# Patient Record
Sex: MALE | Race: WHITE | NOT HISPANIC OR LATINO | Employment: STUDENT | URBAN - METROPOLITAN AREA
[De-identification: names, ages, dates, MRNs, and addresses within clinical notes are randomized per-mention and may not be internally consistent; named-entity substitution may affect disease eponyms.]

---
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
## 2025-04-08 ENCOUNTER — OFFICE VISIT (OUTPATIENT)
Dept: URGENT CARE | Facility: CLINIC | Age: 20
End: 2025-04-08
Payer: MEDICAID

## 2025-04-08 VITALS
SYSTOLIC BLOOD PRESSURE: 149 MMHG | HEART RATE: 69 BPM | RESPIRATION RATE: 17 BRPM | HEIGHT: 70 IN | WEIGHT: 145 LBS | OXYGEN SATURATION: 98 % | TEMPERATURE: 98 F | BODY MASS INDEX: 20.76 KG/M2 | DIASTOLIC BLOOD PRESSURE: 88 MMHG

## 2025-04-08 DIAGNOSIS — S62.643A CLOSED NONDISPLACED FRACTURE OF PROXIMAL PHALANX OF LEFT MIDDLE FINGER, INITIAL ENCOUNTER: Primary | ICD-10-CM

## 2025-04-08 DIAGNOSIS — S69.92XA FINGER INJURY, LEFT, INITIAL ENCOUNTER: ICD-10-CM

## 2025-04-08 PROCEDURE — 73130 X-RAY EXAM OF HAND: CPT | Mod: LT,S$GLB,, | Performed by: RADIOLOGY

## 2025-04-08 PROCEDURE — 99214 OFFICE O/P EST MOD 30 MIN: CPT | Mod: S$GLB,,, | Performed by: NURSE PRACTITIONER

## 2025-04-08 RX ORDER — IBUPROFEN 600 MG/1
600 TABLET ORAL
Status: COMPLETED | OUTPATIENT
Start: 2025-04-08 | End: 2025-04-08

## 2025-04-08 RX ADMIN — IBUPROFEN 600 MG: 600 TABLET ORAL at 01:04

## 2025-04-08 NOTE — LETTER
April 8, 2025      [facility]  [address]  [facility] [address]  Phone: [phone]       Patient: Emiliano Jimenez   YOB: 2005  Date of Visit: 04/08/2025    To Whom It May Concern:    Gray Jimenez  was at [facility] on 04/08/2025. The patient may return to work/school on 04/09/2025 with restrictions of no PE or sports until cleared by peds or orthopedic provider. If you have any questions or concerns, or if I can be of further assistance, please do not hesitate to contact me.    Sincerely,    Castillo Sherman, DNP

## 2025-04-08 NOTE — PATIENT INSTRUCTIONS
Use finger splint to keep the finger straight    Can use Motrin or Tylenol as needed for pain    Can ice and elevate affected area    Follow-up with your pediatric provider and/or orthopedic provider    We will place an orthopedic referral today

## 2025-04-08 NOTE — PROGRESS NOTES
"Subjective:      Patient ID: Emiliano Jimenez is a 19 y.o. male.    Vitals:  height is 5' 10" (1.778 m) and weight is 65.8 kg (145 lb). His temperature is 98.3 °F (36.8 °C). His blood pressure is 149/88 (abnormal) and his pulse is 69. His respiration is 17 and oxygen saturation is 98%.     Chief Complaint: Finger Injury    Patient states he ran into someone at school today and his left middle finger got bent backwards and was stuck and he had to straighten it back out.       Constitution: Negative.   HENT: Negative.     Neck: neck negative.   Cardiovascular: Negative.    Eyes: Negative.    Respiratory: Negative.     Gastrointestinal: Negative.    Endocrine: negative.   Genitourinary: Negative.    Musculoskeletal:  Positive for pain, trauma, joint pain and joint swelling.   Skin: Negative.    Allergic/Immunologic: Negative.    Neurological: Negative.    Hematologic/Lymphatic: Negative.    Psychiatric/Behavioral: Negative.        Objective:     Physical Exam   Constitutional: He is oriented to person, place, and time. He appears well-developed. He is cooperative.   HENT:   Head: Normocephalic and atraumatic.   Ears:   Right Ear: Hearing, tympanic membrane, external ear and ear canal normal.   Left Ear: Hearing, tympanic membrane, external ear and ear canal normal.   Nose: Nose normal. No mucosal edema or nasal deformity. No epistaxis. Right sinus exhibits no maxillary sinus tenderness and no frontal sinus tenderness. Left sinus exhibits no maxillary sinus tenderness and no frontal sinus tenderness.   Mouth/Throat: Uvula is midline, oropharynx is clear and moist and mucous membranes are normal. No trismus in the jaw. Normal dentition. No uvula swelling.   Eyes: Conjunctivae and lids are normal.   Neck: Trachea normal and phonation normal. Neck supple.   Cardiovascular: Normal rate, regular rhythm, normal heart sounds and normal pulses.   Pulmonary/Chest: Effort normal and breath sounds normal.   Abdominal: Normal " appearance and bowel sounds are normal. Soft.   Musculoskeletal: Normal range of motion.         General: Swelling, tenderness and signs of injury present. Normal range of motion.        Hands:       Comments: Tenderness, swelling, and edema, pain with rom   Neurological: no focal deficit. He is alert, oriented to person, place, and time and at baseline. He displays no weakness and normal reflexes. No cranial nerve deficit or sensory deficit. He exhibits normal muscle tone. Coordination normal.   Skin: Skin is warm, dry and intact. Capillary refill takes less than 2 seconds.   Psychiatric: His speech is normal and behavior is normal. Mood, judgment and thought content normal.   Nursing note and vitals reviewed.      EXAMINATION:  XR HAND COMPLETE 3 VIEW LEFT     CLINICAL HISTORY:  . Unspecified injury of left wrist, hand and finger(s), initial encounter     TECHNIQUE:  PA, lateral, and oblique views of the left hand were performed.     COMPARISON:  None     FINDINGS:  There is soft tissue swelling of the left middle finger.  There is a tiny 2 mm bone density seen adjacent to the PIP joint consistent with a small avulsion fracture.  Subluxation or dislocation of the PIP joint is not presently seen.  The rest of the bones of the left hand are intact.     Impression:     Tiny avulsion fragment at the PIP joint of the left index finger.  Soft tissue swelling noted.  Subluxation or dislocation is not presently seen.        Electronically signed by:Jose Trujillo MD  Date:                                            04/08/2025  Time:                                           13:20    Assessment:     1. Closed nondisplaced fracture of proximal phalanx of left middle finger, initial encounter    2. Finger injury, left, initial encounter        Plan:       Closed nondisplaced fracture of proximal phalanx of left middle finger, initial encounter    Finger injury, left, initial encounter  -     X-Ray Hand 3 View Left; Future;  Expected date: 04/08/2025  -     ibuprofen tablet 600 mg          Medical Decision Making:   Finger splint placed, buddie wraped it to the index finger with Coban, neurovascular intactUse finger splint to keep the finger straight    Can use Motrin or Tylenol as needed for pain    Can ice and elevate affected area    Follow-up with your pediatric provider and/or orthopedic provider    We will place an orthopedic referral today

## 2025-05-01 ENCOUNTER — HOSPITAL ENCOUNTER (EMERGENCY)
Facility: HOSPITAL | Age: 20
Discharge: HOME | End: 2025-05-02
Attending: STUDENT IN AN ORGANIZED HEALTH CARE EDUCATION/TRAINING PROGRAM

## 2025-05-01 DIAGNOSIS — M79.645 PAIN OF FINGER OF LEFT HAND: Primary | ICD-10-CM

## 2025-05-01 PROCEDURE — 99283 EMERGENCY DEPT VISIT LOW MDM: CPT | Mod: 25

## 2025-05-02 VITALS
HEART RATE: 66 BPM | HEIGHT: 70 IN | DIASTOLIC BLOOD PRESSURE: 76 MMHG | WEIGHT: 141.75 LBS | OXYGEN SATURATION: 98 % | SYSTOLIC BLOOD PRESSURE: 128 MMHG | BODY MASS INDEX: 20.29 KG/M2 | RESPIRATION RATE: 16 BRPM

## 2025-05-02 NOTE — DISCHARGE INSTRUCTIONS
Your x-ray did not show evidence of a new fracture or dislocation.  Wear your brace as directed.  Keep your orthopedic follow up appointment.      Please return to this facility or another ED as needed for any new or worsening symptoms including chest pain, shortness of breath, abdominal pain, nausea or vomiting, fever or chills. Please follow up with your primary care doctor in 3 days. Please take all medicines as prescribed.

## 2025-05-02 NOTE — ED PROVIDER NOTES
Encounter Date: 5/1/2025       History     Chief Complaint   Patient presents with    Finger Pain     Recent injury. Took splint off at ortho today and finger got yanked tonight and afraid has injured it     HPI  19-year-old no reported past medical history presents for evaluation of finger pain.  He injured his left middle finger earlier this month.  He had a small PIP fracture.  He saw ortho when they said he could come out of his splint.  He caught his finger on someone else's gown well on stage this evening.  Says it is finger hurts more.  Denies any fall or other traumatic injury.  Review of patient's allergies indicates:  No Known Allergies  History reviewed. No pertinent past medical history.  History reviewed. No pertinent surgical history.  Family History   Problem Relation Name Age of Onset    No Known Problems Father      Congenital heart disease Brother Salvatore     No Known Problems Brother Leo     No Known Problems Brother Dandre     No Known Problems Brother Mati     Bone cancer Brother Chaitanya Farfan        stage 4- now in remission    No Known Problems Brother Anand     Arrhythmia Neg Hx      Heart attacks under age 50 Neg Hx      Early death Neg Hx      Pacemaker/defibrilator Neg Hx       Social History[1]  Review of Systems   All other systems reviewed and are negative.      Physical Exam     Initial Vitals [05/01/25 2339]   BP Pulse Resp Temp SpO2   128/74 65 16 -- 98 %      MAP       --         Physical Exam    Nursing note and vitals reviewed.  Constitutional: He appears well-developed and well-nourished.   HENT:   Head: Normocephalic and atraumatic.   Eyes: Right eye exhibits no discharge. Left eye exhibits no discharge.   Neck: No tracheal deviation present.   Cardiovascular:  Normal rate, regular rhythm and intact distal pulses.           Pulmonary/Chest: No stridor. No respiratory distress.   Musculoskeletal:      Comments: Left middle finger is swollen, he is able to flex and extend it but  he has pain, he is tender to palpation over the PIP joint, otherwise nontender, no hand her forearm tenderness     Neurological: He is alert and oriented to person, place, and time.   Skin: Skin is warm and dry. Capillary refill takes less than 2 seconds.         ED Course   Procedures  Labs Reviewed   HEPATITIS C ANTIBODY   HIV 1 / 2 ANTIBODY          Imaging Results              X-Ray Hand 3 view Left (Final result)  Result time 05/02/25 00:28:22      Final result by Santo Kim DO (05/02/25 00:28:22)                   Impression:      Redemonstration of an avulsion fracture of the long finger proximal interphalangeal joint.  No new acute fracture or dislocation.      Electronically signed by: Santo Kim  Date:    05/02/2025  Time:    00:28               Narrative:    EXAMINATION:  XR HAND COMPLETE 3 VIEW LEFT    CLINICAL HISTORY:  PIP injury middle finger;.    TECHNIQUE:  PA, lateral, and oblique views of the left hand were performed.    COMPARISON:  04/08/2025.    FINDINGS:  Again seen is a small osseous density adjacent to the radial aspect of the long finger proximal interphalangeal joint, compatible with a small avulsion fracture, subacute or chronic, unchanged in appearance from prior.  There is no new acute fracture or dislocation. Alignment is normal. Joint spaces are preserved. There is soft tissue edema of the long finger.                                       Medications - No data to display  Medical Decision Making  Re-injured his finger today when he caught it on a piece of clothing, his vitals are within normal limits, on exam he is neurovascularly intact.  He has tenderness to palpation over his PIP joint.  Finger does not look infected.  Flexion-extension at that joint intact.  I will get an x-ray.  I will  him to wear splint.  I will have him to follow up with Orthopedics.  Father is at bedside as well    Amount and/or Complexity of Data Reviewed  Independent Historian: parent      Details: Reports he caught it on someone scan today on stage at school  External Data Reviewed: notes.     Details: Orthopedic from 04/30/2025, recommends coming out of the splint for 4 hours a day and active range of motion  Radiology: ordered and independent interpretation performed. Decision-making details documented in ED Course.               ED Course as of 05/02/25 0039   Fri May 02, 2025   0021 Avulsion fracture noted my independent interpretation PIP [IC]   0038 Repeat exam reassuring discussed results with the patient in his dad, he will take Tylenol and anti-inflammatory at home, Return precautions/follow up instructions/treatment plan given, expressed agreement and understanding.    [IC]      ED Course User Index  [IC] Chandler Hays MD                           Clinical Impression:  Final diagnoses:  [M79.645] Pain of finger of left hand (Primary)                   [1]   Social History  Tobacco Use    Smoking status: Never    Smokeless tobacco: Never   Substance Use Topics    Alcohol use: Never    Drug use: Never        Chandler Hays MD  05/02/25 0039

## 2025-05-27 DIAGNOSIS — S62.618D: Primary | ICD-10-CM

## 2025-05-29 ENCOUNTER — CLINICAL SUPPORT (OUTPATIENT)
Dept: REHABILITATION | Facility: HOSPITAL | Age: 20
End: 2025-05-29
Payer: MEDICAID

## 2025-05-29 DIAGNOSIS — M79.645 PAIN OF FINGER OF LEFT HAND: ICD-10-CM

## 2025-05-29 DIAGNOSIS — R53.1 WEAKNESS: ICD-10-CM

## 2025-05-29 DIAGNOSIS — M25.60 STIFFNESS IN JOINT: Primary | ICD-10-CM

## 2025-05-29 PROBLEM — M79.646 PAIN IN FINGER: Status: ACTIVE | Noted: 2025-05-29

## 2025-05-29 PROCEDURE — 97165 OT EVAL LOW COMPLEX 30 MIN: CPT | Mod: PN

## 2025-05-29 PROCEDURE — 97530 THERAPEUTIC ACTIVITIES: CPT | Mod: PN

## 2025-05-29 NOTE — PATIENT INSTRUCTIONS
"[facility], OCCUPATIONAL THERAPY  HOME EXERCISE PROGRAM     AROM: DIP Flexion / Extension  Pinch middle knuckle to prevent bending.   Bend end knuckle until stretch is felt. Hold   3 seconds. Relax. Straighten finger as far as possible.      AROM: PIP Flexion / Extension  Pinch bottom knuckle  to prevent bending.   Actively bend middle knuckle until stretch is felt.   Hold 3 seconds. Relax. Straighten finger as far as possible.  AROM: Isolated MCP Flexion / Extension ("Wave")   Bend only your large, bottom knuckles. Hold 3 seconds.   Keep the tips of your fingers straight. Straighten fingers.      AROM: Isolated IPJ Flexion / Extension ("Hook")   Bend only your middle and end knuckles. Hold 3 seconds.   Straighten your fingers.       AROM: MCP and PIP Flexion / Extension ("Straight Fist")  Bend your bottom and middle knuckles, keeping the tips of your fingers straight.   Try to touch the pads of your fingers on your palm. Hold 3 seconds.   Straighten your fingers.       AROM: Composite Flexion / Extension ("Full Fist")  Bend every joint in your hand into a fist. Hold 3 seconds.   Straighten your fingers.         AROM: Composte Extension ("Finger Lifts")  Lift your finger off of the table one at a time. Hold 3 seconds.   Relax your finger.      AROM: Abduction / Adduction  With hand flat on table, spread all fingers apart,   then bring them together as close as possible.      Therapist: Sun Marie, OT       "

## 2025-05-29 NOTE — PROGRESS NOTES
Outpatient Rehab    Occupational Therapy Evaluation    Patient Name: [place] Jimenez  MRN: [id number]  YOB: 2005  Encounter Date: 5/29/2025    Therapy Diagnosis:   Encounter Diagnoses   Name Primary?    Stiffness in joint Yes    Pain of finger of left hand     Weakness      Physician: Juan Antonio Wasserman MD    Physician Orders: Eval and Treat  Medical Diagnosis: Fracture of proximal phalanx of middle finger with routine healing    Visit # / Visits Authorized: 1 / 1  Insurance Authorization Period: 5/27/2025 to 12/31/2025  Date of Evaluation: 5/29/2025  Plan of Care Certification: 5/29/2025 to 7/28/25     Time In: 1445   Time Out: 1520  Total Time (in minutes): 35   Total Billable Time (in minutes): 35      Subjective   History of Present Illness  Cuba is a 19 y.o. male who reports to occupational therapy with a chief concern of weakness, stiffness and pain.     The patient reports a medical diagnosis of S62.618D (ICD-10-CM) - Fracture of proximal phalanx of middle finger with routine healing. The patient has experienced this issue since 05/29/25.   Diagnostic tests related to this condition: X-ray.   X-Ray Details: FINDINGS:  Again seen is a small osseous density adjacent to the radial aspect of the long finger proximal interphalangeal joint, compatible with a small avulsion fracture, subacute or chronic, unchanged in appearance from prior.  There is no new acute fracture or dislocation. Alignment is normal. Joint spaces are preserved. There is soft tissue edema of the long finger.     Impression:     Redemonstration of an avulsion fracture of the long finger proximal interphalangeal joint.  No new acute fracture or dislocation.    Dominant Hand: Right  History of Present Condition/Illness: Pt states that he ran into a football player and broke his finger on 4/8/25. He went to the ER and was referred to orthopedic where he was placed in an Alumafoam splint. He thought he reinjured his finger at  graduation of 5/2/25. He went back for repeat xrays and no extra damage was done. He is being referred to therapy for ROM of finger and an HEP    Activities of Daily Living  Social history was obtained from Patient.          Patient Responsibilities: Community mobility, Health management, Home management, Personal ADL, Yard work    Previously independent with activities of daily living? Yes     Currently independent with activities of daily living? Yes          Previously independent with instrumental activities of daily living? Yes     Currently independent with instrumental activities of daily living? Yes              Pain     Patient reports a current pain level of 1/10. Pain at best is reported as 0/10. Pain at worst is reported as 7/10.   Clinical Progression (since onset): Stable  Pain Qualities: Aching, Dull  Pain-Relieving Factors: Immobilization, Rest, Activity modification  Pain-Aggravating Factors: Bending, Computer work, Holding objects, Movement         Treatment History  Treatments  Previously Received Treatments: No  Currently Receiving Treatments: No    Living Arrangements  Living Situation  Housing: Home independently  Living Arrangements: Parent  Support Systems: Parent        Employment  Patient does not report that: Does the patient's condition impact their ability to work?  Employment Status: Not applicable          Past Medical History/Physical Systems Review:   Emiliano Jimenez  has no past medical history on file.    Emiliano Jimenez  has no past surgical history on file.    Emiliano has a current medication list which includes the following prescription(s): amoxicillin-clavulanate 875-125mg.    Review of patient's allergies indicates:  No Known Allergies     Objective       Digit 3 - Middle Finger Active Range of Motion  Right Middle Finger   Extension (deg) Flexion (deg) Pain   MCP   100     PIP   100     DIP   95     GUERRA:      Left Middle Finger   Extension (deg) Flexion (deg) Pain    MCP   90     PIP 15 80     DIP   65     GUERRA:                        Right  Strength  Right Hand Dynamometer Position: 2  Elbow Position Forearm Position Trial 1 (lbs) Trial 2  (lbs) Trial 3  (lbs) Average  (lbs) Pain   Flexed Neutral 85 85 85 85         Left  Strength  Left Hand Dynamometer Position: 2  Elbow Position Forearm Position Trial 1 (lbs) Trial 2 (lbs) Trial 3 (lbs) Average (lbs) Pain   Flexed Neutral 70 70 70 70                   Treatment:  Therapeutic Activity  TA 1: demonstration and explanation of HEP    Time Entry(in minutes):  OT Evaluation (Low) Time Entry: 15  Therapeutic Activity Time Entry: 20    Assessment & Plan   Assessment  Cam presents with a condition of Low complexity.   Presentation of Symptoms: Stable  Will Comorbidities Impact Care: No       ADL Limitations : Bathing/showering, Dressing, Personal device care, Personal hygiene and grooming, Toileting and toilet hygiene  IADL Limitations: Driving, Health management and maintenance, Home establishment and management  Leisure Limitations: Leisure participation  Functional Limitations: Carrying objects, Fine motor coordination, Manipulating objects, Pain with ADLs/IADLs, Range of motion                 Evaluation/Treatment Response: Patient responded to treatment well  Patient Goal for Therapy (OT): To be able to bend his middle finger through full range of motion  Prognosis: Good    Plan  From an occupational therapy perspective, the patient would benefit from: Skilled Rehab Services    Planned therapy interventions include: Therapeutic exercise, Therapeutic activities, Neuromuscular re-education, Manual therapy, ADLs/IADLs, Orthotic management and training, Lymphatic compression wrapping, Prosthetic management and training, Work conditioning, Work hardening, and Wound care.    Planned modalities to include: Contrast bath, Cryotherapy (cold pack), Electrical stimulation - attended, Electrical stimulation - passive/unattended,  Fluidotherapy, Paraffin bath, Thermotherapy (hot pack), Ultrasound, and Whirlpool.        Visit Frequency: 2 times Per Week for 3 Weeks.       This plan was discussed with Patient.   Discussion participants: Agreed Upon Plan of Care             The patient's spiritual, cultural, and educational needs were considered, and the patient is agreeable to the plan of care and goals.     Education  Education was done with Patient. The patient's learning style includes Demonstration. The patient Demonstrates understanding and Verbalizes understanding.         Pt was educated on HEP which can be found under patient instructions.        Goals:   Active       LTGs       Pt will increase ROM in PIP of left long finger to 100 degrees in order to participate in ADLs without assistance        Start:  05/29/25    Expected End:  07/28/25            Pt will report pain at worst to be no more than 1/10       Start:  05/29/25    Expected End:  07/28/25            Pt will increase  strength in L hand to 75 psi in order to participate in ADLs without assistance        Start:  05/29/25    Expected End:  07/28/25               STGs       Initiate HEP       Start:  05/29/25    Expected End:  06/28/25            Pt will increase ROM in PIP of left long finger to 95 degrees in order to participate in ADLs without assistance        Start:  05/29/25    Expected End:  06/28/25            Pt will report pain at worst to be no more than 4/10       Start:  05/29/25    Expected End:  06/28/25            Pt will increase  strength in L hand to 73 psi in order to participate in ADLs without assistance        Start:  05/29/25    Expected End:  06/28/25                Sun Marie, OT

## 2025-05-29 NOTE — LETTER
May 29, 2025  Juan Antonio Wasserman MD  [address]  [address]  [facility]  [place] [address]    To whom it may concern,     The attached plan of care is being sent to you for review and reference.    You may indicate your approval by signing the document electronically, or by faxing/mailing a signed copy of the final page of this document back to the attention of Sun Marie, OT:         Plan of Care 5/29/25   Effective from: 5/29/2025  Effective to: 7/28/2025    Plan ID: [id number]            Participants as of Finalize on 5/29/2025    Name Type Comments Contact Info    Juan Antonio Wasserman MD Referring Provider  [phone]       Last Plan Note     Author: Sun Marie OT Status: Signed Last edited: 5/29/2025  3:00 PM         Outpatient Rehab    Occupational Therapy Evaluation    Patient Name: Cuba Jimenez  MRN: [id number]  YOB: 2005  Encounter Date: 5/29/2025    Therapy Diagnosis:   Encounter Diagnoses   Name Primary?    Stiffness in joint Yes    Pain of finger of left hand     Weakness      Physician: Juan Antonio Wasserman MD    Physician Orders: Eval and Treat  Medical Diagnosis: Fracture of proximal phalanx of middle finger with routine healing    Visit # / Visits Authorized: 1 / 1  Insurance Authorization Period: 5/27/2025 to 12/31/2025  Date of Evaluation: 5/29/2025  Plan of Care Certification: 5/29/2025 to 7/28/25     Time In: 1445   Time Out: 1520  Total Time (in minutes): 35   Total Billable Time (in minutes): 35      Subjective   History of Present Illness  Cuba is a 19 y.o. male who reports to occupational therapy with a chief concern of weakness, stiffness and pain.     The patient reports a medical diagnosis of S62.618D (ICD-10-CM) - Fracture of proximal phalanx of middle finger with routine healing. The patient has experienced this issue since 05/29/25.   Diagnostic tests related to this condition: X-ray.   X-Ray Details: FINDINGS:  Again seen is a small  osseous density adjacent to the radial aspect of the long finger proximal interphalangeal joint, compatible with a small avulsion fracture, subacute or chronic, unchanged in appearance from prior.  There is no new acute fracture or dislocation. Alignment is normal. Joint spaces are preserved. There is soft tissue edema of the long finger.     Impression:     Redemonstration of an avulsion fracture of the long finger proximal interphalangeal joint.  No new acute fracture or dislocation.    Dominant Hand: Right  History of Present Condition/Illness: Pt states that he ran into a football player and broke his finger on 4/8/25. He went to the ER and was referred to orthopedic where he was placed in an Alumafoam splint. He thought he reinjured his finger at graduation of 5/2/25. He went back for repeat xrays and no extra damage was done. He is being referred to therapy for ROM of finger and an HEP    Activities of Daily Living  Social history was obtained from Patient.          Patient Responsibilities: Community mobility, Health management, Home management, Personal ADL, Yard work    Previously independent with activities of daily living? Yes     Currently independent with activities of daily living? Yes          Previously independent with instrumental activities of daily living? Yes     Currently independent with instrumental activities of daily living? Yes              Pain     Patient reports a current pain level of 1/10. Pain at best is reported as 0/10. Pain at worst is reported as 7/10.   Clinical Progression (since onset): Stable  Pain Qualities: Aching, Dull  Pain-Relieving Factors: Immobilization, Rest, Activity modification  Pain-Aggravating Factors: Bending, Computer work, Holding objects, Movement         Treatment History  Treatments  Previously Received Treatments: No  Currently Receiving Treatments: No    Living Arrangements  Living Situation  Housing: Home independently  Living Arrangements:  Parent  Support Systems: Parent        Employment  Patient does not report that: Does the patient's condition impact their ability to work?  Employment Status: Not applicable          Past Medical History/Physical Systems Review:   Emiliano Jimenez  has no past medical history on file.    Emiliano Jimenez  has no past surgical history on file.    Emiliano has a current medication list which includes the following prescription(s): amoxicillin-clavulanate 875-125mg.    Review of patient's allergies indicates:  No Known Allergies     Objective       Digit 3 - Middle Finger Active Range of Motion  Right Middle Finger   Extension (deg) Flexion (deg) Pain   MCP   100     PIP   100     DIP   95     GUERRA:      Left Middle Finger   Extension (deg) Flexion (deg) Pain   MCP   90     PIP 15 80     DIP   65     GUERRA:                        Right  Strength  Right Hand Dynamometer Position: 2  Elbow Position Forearm Position Trial 1 (lbs) Trial 2  (lbs) Trial 3  (lbs) Average  (lbs) Pain   Flexed Neutral 85 85 85 85         Left  Strength  Left Hand Dynamometer Position: 2  Elbow Position Forearm Position Trial 1 (lbs) Trial 2 (lbs) Trial 3 (lbs) Average (lbs) Pain   Flexed Neutral 70 70 70 70                   Treatment:  Therapeutic Activity  TA 1: demonstration and explanation of HEP    Time Entry(in minutes):  OT Evaluation (Low) Time Entry: 15  Therapeutic Activity Time Entry: 20    Assessment & Plan   Assessment  Cam presents with a condition of Low complexity.   Presentation of Symptoms: Stable  Will Comorbidities Impact Care: No       ADL Limitations : Bathing/showering, Dressing, Personal device care, Personal hygiene and grooming, Toileting and toilet hygiene  IADL Limitations: Driving, Health management and maintenance, Home establishment and management  Leisure Limitations: Leisure participation  Functional Limitations: Carrying objects, Fine motor coordination, Manipulating objects, Pain with ADLs/IADLs,  Range of motion                 Evaluation/Treatment Response: Patient responded to treatment well  Patient Goal for Therapy (OT): To be able to bend his middle finger through full range of motion  Prognosis: Good    Plan  From an occupational therapy perspective, the patient would benefit from: Skilled Rehab Services    Planned therapy interventions include: Therapeutic exercise, Therapeutic activities, Neuromuscular re-education, Manual therapy, ADLs/IADLs, Orthotic management and training, Lymphatic compression wrapping, Prosthetic management and training, Work conditioning, Work hardening, and Wound care.    Planned modalities to include: Contrast bath, Cryotherapy (cold pack), Electrical stimulation - attended, Electrical stimulation - passive/unattended, Fluidotherapy, Paraffin bath, Thermotherapy (hot pack), Ultrasound, and Whirlpool.        Visit Frequency: 2 times Per Week for 3 Weeks.       This plan was discussed with Patient.   Discussion participants: Agreed Upon Plan of Care             The patient's spiritual, cultural, and educational needs were considered, and the patient is agreeable to the plan of care and goals.     Education  Education was done with Patient. The patient's learning style includes Demonstration. The patient Demonstrates understanding and Verbalizes understanding.         Pt was educated on HEP which can be found under patient instructions.        Goals:   Active       LTGs       Pt will increase ROM in PIP of left long finger to 100 degrees in order to participate in ADLs without assistance        Start:  05/29/25    Expected End:  07/28/25            Pt will report pain at worst to be no more than 1/10       Start:  05/29/25    Expected End:  07/28/25            Pt will increase  strength in L hand to 75 psi in order to participate in ADLs without assistance        Start:  05/29/25    Expected End:  07/28/25               STGs       Initiate HEP       Start:  05/29/25     Expected End:  06/28/25            Pt will increase ROM in PIP of left long finger to 95 degrees in order to participate in ADLs without assistance        Start:  05/29/25    Expected End:  06/28/25            Pt will report pain at worst to be no more than 4/10       Start:  05/29/25    Expected End:  06/28/25            Pt will increase  strength in L hand to 73 psi in order to participate in ADLs without assistance        Start:  05/29/25    Expected End:  06/28/25                Sun Marie OT           Current Participants as of 5/29/2025    Name Type Comments Contact Info    Juan Antonio Wasserman MD Referring Provider  [phone]    Signature pending            Sincerely,      Sun Marie OT  [facility]                                                            Dear Sun Marie OT,    RE: Mr. Emiliano Jimenez, MRN: [id number]    I certify that I have reviewed the attached plan of care and agree to the details within.        ___________________________  ___________________________  Provider Printed Name   Provider Signed Name      ___________________________  Date and Time

## 2025-06-03 ENCOUNTER — CLINICAL SUPPORT (OUTPATIENT)
Dept: REHABILITATION | Facility: HOSPITAL | Age: 20
End: 2025-06-03
Payer: MEDICARE

## 2025-06-03 DIAGNOSIS — M79.645 PAIN OF FINGER OF LEFT HAND: ICD-10-CM

## 2025-06-03 DIAGNOSIS — R53.1 WEAKNESS: ICD-10-CM

## 2025-06-03 DIAGNOSIS — M25.60 STIFFNESS IN JOINT: Primary | ICD-10-CM

## 2025-06-03 PROCEDURE — 97018 PARAFFIN BATH THERAPY: CPT | Mod: PN

## 2025-06-03 PROCEDURE — 97530 THERAPEUTIC ACTIVITIES: CPT | Mod: PN

## 2025-06-03 PROCEDURE — 97140 MANUAL THERAPY 1/> REGIONS: CPT | Mod: PN

## 2025-06-06 ENCOUNTER — CLINICAL SUPPORT (OUTPATIENT)
Dept: REHABILITATION | Facility: HOSPITAL | Age: 20
End: 2025-06-06
Payer: MEDICARE

## 2025-06-06 DIAGNOSIS — M25.60 STIFFNESS IN JOINT: Primary | ICD-10-CM

## 2025-06-06 DIAGNOSIS — M79.645 PAIN OF FINGER OF LEFT HAND: ICD-10-CM

## 2025-06-06 DIAGNOSIS — R53.1 WEAKNESS: ICD-10-CM

## 2025-06-06 PROCEDURE — 97140 MANUAL THERAPY 1/> REGIONS: CPT | Mod: PN

## 2025-06-06 PROCEDURE — 97530 THERAPEUTIC ACTIVITIES: CPT | Mod: PN

## 2025-06-10 ENCOUNTER — CLINICAL SUPPORT (OUTPATIENT)
Dept: REHABILITATION | Facility: HOSPITAL | Age: 20
End: 2025-06-10

## 2025-06-10 DIAGNOSIS — R53.1 WEAKNESS: ICD-10-CM

## 2025-06-10 DIAGNOSIS — M25.60 STIFFNESS IN JOINT: Primary | ICD-10-CM

## 2025-06-10 DIAGNOSIS — M79.645 PAIN OF FINGER OF LEFT HAND: ICD-10-CM

## 2025-06-10 PROCEDURE — 97140 MANUAL THERAPY 1/> REGIONS: CPT | Mod: PN

## 2025-06-10 PROCEDURE — 97530 THERAPEUTIC ACTIVITIES: CPT | Mod: PN

## 2025-06-10 NOTE — PROGRESS NOTES
Outpatient Rehab    Occupational Therapy Visit    Patient Name: Cuba Jimenez  MRN: [id number]  YOB: 2005  Encounter Date: 6/10/2025    Therapy Diagnosis:   Encounter Diagnoses   Name Primary?    Stiffness in joint Yes    Pain of finger of left hand     Weakness      Physician: Juan Antonio Wasserman MD    Physician Orders: Eval and Treat  Medical Diagnosis: Fracture of proximal phalanx of middle finger with routine healing  Surgical Diagnosis: Not applicable for this Episode   Surgical Date: Not applicable for this Episode    Visit # / Visits Authorized: 3 / 20  Insurance Authorization Period: 5/28/2025 to 12/31/2025  Date of Evaluation: 5/29/2025  Plan of Care Certification: 5/29/2025 to 7/28/2025      Time In: 1520   Time Out: 1600  Total Time (in minutes): 40   Total Billable Time (in minutes): 40    Subjective   Pt states that his finger hurts when he clenches his fist.  Pain reported as 3/10.      Objective        Progress note to be taken every 10th visit or every 30 days with updated objective information     Treatment:  Therapeutic Activity  TA 1: pink putty   TA 2: pink putty rolls and pinches x 3  TA 3: bead lacing x 3  TA 4: hand xtrainer x 3 minutes  TA 5: pom pom  with red clothespin x 3  TA 6: TGE's  TA 7: pink putty pill tops  TA 8: isospheres x 3 minutes  TA 9: putty HEP  Manual Therapy  MT 1: joint blocking of long finger with intrinsic hand stretches    Time Entry(in minutes):  Manual Therapy Time Entry: 15  Therapeutic Activity Time Entry: 25    Assessment & Plan   Assessment: Pt tolerated added therapeutic activities well today without complaints of additional pain. Focus of therapy to be pain free range of motion. Updated putty HEP was given to patient in order to progress in strengthening of left hand.  Pt is motivated. Will progress as tolerated  Evaluation/Treatment Tolerance: Patient tolerated treatment well    The patient will continue to benefit from skilled  outpatient occupational therapy in order to address the deficits listed in the problem list on the initial evaluation, provide patient and family education, and maximize the patients level of independence in the home and community environments.     The patient's spiritual, cultural, and educational needs were considered, and the patient is agreeable to the plan of care and goals.           Plan: Continue with current plan of care    Goals:   Active       LTGs       Pt will increase ROM in PIP of left long finger to 100 degrees in order to participate in ADLs without assistance  (Progressing)       Start:  05/29/25    Expected End:  07/28/25            Pt will report pain at worst to be no more than 1/10 (Progressing)       Start:  05/29/25    Expected End:  07/28/25            Pt will increase  strength in L hand to 75 psi in order to participate in ADLs without assistance  (Progressing)       Start:  05/29/25    Expected End:  07/28/25               STGs       Initiate HEP (Progressing)       Start:  05/29/25    Expected End:  06/28/25            Pt will increase ROM in PIP of left long finger to 95 degrees in order to participate in ADLs without assistance  (Progressing)       Start:  05/29/25    Expected End:  06/28/25            Pt will report pain at worst to be no more than 4/10 (Progressing)       Start:  05/29/25    Expected End:  06/28/25            Pt will increase  strength in L hand to 73 psi in order to participate in ADLs without assistance  (Progressing)       Start:  05/29/25    Expected End:  06/28/25                Sun Marie, OT

## 2025-06-12 ENCOUNTER — CLINICAL SUPPORT (OUTPATIENT)
Dept: REHABILITATION | Facility: HOSPITAL | Age: 20
End: 2025-06-12

## 2025-06-12 DIAGNOSIS — M25.60 STIFFNESS IN JOINT: Primary | ICD-10-CM

## 2025-06-12 DIAGNOSIS — M79.645 PAIN OF FINGER OF LEFT HAND: ICD-10-CM

## 2025-06-12 DIAGNOSIS — R53.1 WEAKNESS: ICD-10-CM

## 2025-06-12 PROCEDURE — 97022 WHIRLPOOL THERAPY: CPT | Mod: PN

## 2025-06-12 PROCEDURE — 97140 MANUAL THERAPY 1/> REGIONS: CPT | Mod: PN

## 2025-06-12 PROCEDURE — 97530 THERAPEUTIC ACTIVITIES: CPT | Mod: PN

## 2025-06-12 NOTE — PROGRESS NOTES
Outpatient Rehab    Occupational Therapy Visit    Patient Name: Cuba Jimenez  MRN: [id number]  YOB: 2005  Encounter Date: 6/12/2025    Therapy Diagnosis:   Encounter Diagnoses   Name Primary?    Stiffness in joint Yes    Pain of finger of left hand     Weakness      Physician: Juan Antonio Wasserman MD    Physician Orders: Eval and Treat  Medical Diagnosis: Fracture of proximal phalanx of middle finger with routine healing  Surgical Diagnosis: Not applicable for this Episode   Surgical Date: Not applicable for this Episode    Visit # / Visits Authorized: 4 / 20  Insurance Authorization Period: 5/28/2025 to 12/31/2025  Date of Evaluation: 5/29/2025  Plan of Care Certification: 5/29/2025 to 7/28/2025      Time In: 1500   Time Out: 1555  Total Time (in minutes): 55   Total Billable Time (in minutes): 55         Subjective   Pt states he still gets a pain when he is making a fist.  Pain reported as 3/10.      Objective        Progress note to be taken every 10th visit or every 30 days with updated objective information     Treatment:  Therapeutic Activity  TA 1: pink putty   TA 2: pink putty rolls and pinches x 3  TA 3: bead lacing x 3  TA 4: hand xtrainer x 3 minutes  TA 5: pom pom  with red clothespin x 3  TA 6: TGE's  TA 7: pink putty pill tops  TA 8: isospheres x 3 minutes  TA 9: putty HEP  Manual Therapy  MT 1: joint blocking of long finger with intrinsic hand stretches    Time Entry(in minutes):  [place]irlpool Time Entry: 15  Manual Therapy Time Entry: 15  Therapeutic Activity Time Entry: 30    Assessment & Plan   Assessment: Pt tolerated added therapeutic activities well today without complaints of additional pain. Focus of therapy to be pain free range of motion.  Pt is motivated. Will progress as tolerated  Evaluation/Treatment Tolerance: Patient tolerated treatment well    The patient will continue to benefit from skilled outpatient occupational therapy in order to address the deficits  listed in the problem list on the initial evaluation, provide patient and family education, and maximize the patients level of independence in the home and community environments.     The patient's spiritual, cultural, and educational needs were considered, and the patient is agreeable to the plan of care and goals.           Plan: Continue with current plan of care    Goals:   Active       LTGs       Pt will increase ROM in PIP of left long finger to 100 degrees in order to participate in ADLs without assistance  (Progressing)       Start:  05/29/25    Expected End:  07/28/25            Pt will report pain at worst to be no more than 1/10 (Progressing)       Start:  05/29/25    Expected End:  07/28/25            Pt will increase  strength in L hand to 75 psi in order to participate in ADLs without assistance  (Progressing)       Start:  05/29/25    Expected End:  07/28/25               STGs       Initiate HEP (Progressing)       Start:  05/29/25    Expected End:  06/28/25            Pt will increase ROM in PIP of left long finger to 95 degrees in order to participate in ADLs without assistance  (Progressing)       Start:  05/29/25    Expected End:  06/28/25            Pt will report pain at worst to be no more than 4/10 (Progressing)       Start:  05/29/25    Expected End:  06/28/25            Pt will increase  strength in L hand to 73 psi in order to participate in ADLs without assistance  (Progressing)       Start:  05/29/25    Expected End:  06/28/25                Sun Marie OT

## 2025-06-17 ENCOUNTER — CLINICAL SUPPORT (OUTPATIENT)
Dept: REHABILITATION | Facility: HOSPITAL | Age: 20
End: 2025-06-17
Payer: MEDICAID

## 2025-06-17 DIAGNOSIS — M79.645 PAIN OF FINGER OF LEFT HAND: ICD-10-CM

## 2025-06-17 DIAGNOSIS — R53.1 WEAKNESS: ICD-10-CM

## 2025-06-17 DIAGNOSIS — M25.60 STIFFNESS IN JOINT: Primary | ICD-10-CM

## 2025-06-17 PROCEDURE — 97140 MANUAL THERAPY 1/> REGIONS: CPT | Mod: PN

## 2025-06-17 PROCEDURE — 97530 THERAPEUTIC ACTIVITIES: CPT | Mod: PN

## 2025-06-17 PROCEDURE — 97022 WHIRLPOOL THERAPY: CPT | Mod: PN

## 2025-06-17 NOTE — PROGRESS NOTES
Outpatient Rehab    Occupational Therapy Visit    Patient Name: Cuba Jimenez  MRN: [id number]  YOB: 2005  Encounter Date: 6/17/2025    Therapy Diagnosis:   Encounter Diagnoses   Name Primary?    Stiffness in joint Yes    Pain of finger of left hand     Weakness      Physician: Juan Antonio Wasserman MD    Physician Orders: Eval and Treat  Medical Diagnosis: Fracture of proximal phalanx of middle finger with routine healing  Surgical Diagnosis: Not applicable for this Episode   Surgical Date: Not applicable for this Episode  Days Since Last Surgery: Not applicable for this Episode    Visit # / Visits Authorized: 5 / 20  Insurance Authorization Period: 5/28/2025 to 12/31/2025  Date of Evaluation: 5/29/2025  Plan of Care Certification: 5/29/2025 to 7/28/2025      Time In: 1500   Time Out: 1555  Total Time (in minutes): 55   Total Billable Time (in minutes): 55       Subjective   Pt states he is continuing to do his exercises.  Pain reported as 3/10.      Objective          Progress note to be taken every 10th visit or every 30 days with updated objective information   Treatment:  Therapeutic Activity  TA 1: pink putty   TA 2: pink putty rolls and pinches x 3  TA 3: bead lacing x 3  TA 4: hand xtrainer x 3 minutes  TA 5: pom pom  with green clothespin x 3  TA 6: TGE's  TA 7: pink putty pill tops  TA 8: isospheres x 3 minutes  Manual Therapy  MT 1: joint blocking of long finger with intrinsic hand stretches    Time Entry(in minutes):  [place]irlpool Time Entry: 15  Manual Therapy Time Entry: 10  Therapeutic Activity Time Entry: 30    Assessment & Plan   Assessment: Pt tolerated added therapeutic activities well today without complaints of additional pain. Focus of therapy to be pain free range of motion. Pt is motivated. Will progress as tolerated  Evaluation/Treatment Tolerance: Patient tolerated treatment well    The patient will continue to benefit from skilled outpatient occupational therapy in  order to address the deficits listed in the problem list on the initial evaluation, provide patient and family education, and maximize the patients level of independence in the home and community environments.     The patient's spiritual, cultural, and educational needs were considered, and the patient is agreeable to the plan of care and goals.           Plan: Continue with current plan of care    Goals:   Active       LTGs       Pt will increase ROM in PIP of left long finger to 100 degrees in order to participate in ADLs without assistance  (Progressing)       Start:  05/29/25    Expected End:  07/28/25            Pt will report pain at worst to be no more than 1/10 (Progressing)       Start:  05/29/25    Expected End:  07/28/25            Pt will increase  strength in L hand to 75 psi in order to participate in ADLs without assistance  (Progressing)       Start:  05/29/25    Expected End:  07/28/25               STGs       Initiate HEP (Progressing)       Start:  05/29/25    Expected End:  06/28/25            Pt will increase ROM in PIP of left long finger to 95 degrees in order to participate in ADLs without assistance  (Progressing)       Start:  05/29/25    Expected End:  06/28/25            Pt will report pain at worst to be no more than 4/10 (Progressing)       Start:  05/29/25    Expected End:  06/28/25            Pt will increase  strength in L hand to 73 psi in order to participate in ADLs without assistance  (Progressing)       Start:  05/29/25    Expected End:  06/28/25                Sun Marie, OT

## 2025-06-19 ENCOUNTER — CLINICAL SUPPORT (OUTPATIENT)
Dept: REHABILITATION | Facility: HOSPITAL | Age: 20
End: 2025-06-19
Payer: MEDICAID

## 2025-06-19 DIAGNOSIS — M79.645 PAIN OF FINGER OF LEFT HAND: ICD-10-CM

## 2025-06-19 DIAGNOSIS — M25.60 STIFFNESS IN JOINT: Primary | ICD-10-CM

## 2025-06-19 DIAGNOSIS — R53.1 WEAKNESS: ICD-10-CM

## 2025-06-19 PROCEDURE — 97022 WHIRLPOOL THERAPY: CPT | Mod: PN

## 2025-06-19 PROCEDURE — 97140 MANUAL THERAPY 1/> REGIONS: CPT | Mod: PN

## 2025-06-19 PROCEDURE — 97530 THERAPEUTIC ACTIVITIES: CPT | Mod: PN

## 2025-06-19 NOTE — PROGRESS NOTES
Outpatient Rehab    Occupational Therapy Discharge    Patient Name: Cuba Jimenez  MRN: [id number]  YOB: 2005  Encounter Date: 6/19/2025    Therapy Diagnosis:   Encounter Diagnoses   Name Primary?    Stiffness in joint Yes    Pain of finger of left hand     Weakness      Physician: Juan Antonio Wasserman MD    Physician Orders: Eval and Treat  Medical Diagnosis: Fracture of proximal phalanx of middle finger with routine healing  Surgical Diagnosis: Not applicable for this Episode   Surgical Date: Not applicable for this Episode  Days Since Last Surgery: Not applicable for this Episode    Visit # / Visits Authorized: 6 / 12  Insurance Authorization Period: 6/17/2025 to 8/16/2025  Date of Evaluation: 5/29/2025  Plan of Care Certification: 5/29/2025 to 7/28/2025      Time In: 1505   Time Out: 1600  Total Time (in minutes): 55   Total Billable Time (in minutes): 55    Subjective   Pt states he has more movement in his hand.  Pain reported as 1/10.      Objective       Digit 3 - Middle Finger Active Range of Motion  Left Middle Finger   Extension (deg) Flexion (deg) Pain   MCP   105     PIP 0 85     DIP   90     GUERRA:                                   Treatment:  Therapeutic Activity  TA 1: pink putty   TA 2: pink putty rolls and pinches x 3  TA 3: bead lacing x 3  TA 4: hand xtrainer x 3 minutes  TA 5: pom pom  with green clothespin x 3  TA 6: TGE's  TA 7: pink putty pill tops  TA 8: isospheres x 3 minutes  TA 9: reassessment of objective measures  Manual Therapy  MT 1: joint blocking of long finger with intrinsic hand stretches  Modalities  Fluidotherapy: while completing AROM of wrist and hand to increase ROM and decrease stiffness    Time Entry(in minutes):  Whirlpool Time Entry: 15  Manual Therapy Time Entry: 10  Therapeutic Activity Time Entry: 30    Assessment & Plan   Assessment: Pt has met all of his LTGs and STGs at this time. he is no longer having pain when completing ADLs and has full  range of motion of his hand. Due to patient's progression, he is no longer a candidate for occupational therapy services. Pt is being discharged from occupational therapy services at this time. Pt was given an updated HEP and he demonstrated good understanding. Pt was informed if that he has any changes contact his MD.   Evaluation/Treatment Tolerance: Patient tolerated treatment well    The patient's spiritual, cultural, and educational needs were considered, and the patient is agreeable to the plan of care and goals.           Plan: Pt d/c    Goals:   Active       STGs       Initiate HEP (Met)       Start:  05/29/25    Expected End:  06/28/25    Resolved:  06/19/25         Pt will increase ROM in PIP of left long finger to 95 degrees in order to participate in ADLs without assistance  (Met)       Start:  05/29/25    Expected End:  06/28/25    Resolved:  06/19/25         Pt will report pain at worst to be no more than 4/10 (Met)       Start:  05/29/25    Expected End:  06/28/25    Resolved:  06/19/25         Pt will increase  strength in L hand to 73 psi in order to participate in ADLs without assistance  (Progressing)       Start:  05/29/25    Expected End:  06/28/25              Resolved       LTGs       Pt will increase ROM in PIP of left long finger to 100 degrees in order to participate in ADLs without assistance  (Met)       Start:  05/29/25    Expected End:  07/28/25    Resolved:  06/19/25         Pt will report pain at worst to be no more than 1/10 (Met)       Start:  05/29/25    Expected End:  07/28/25    Resolved:  06/19/25         Pt will increase  strength in L hand to 75 psi in order to participate in ADLs without assistance  (Met)       Start:  05/29/25    Expected End:  07/28/25    Resolved:  06/19/25             Sun Marie, OT

## 2025-08-15 ENCOUNTER — OFFICE VISIT (OUTPATIENT)
Dept: URGENT CARE | Facility: CLINIC | Age: 20
End: 2025-08-15
Payer: MEDICAID

## 2025-08-15 VITALS
DIASTOLIC BLOOD PRESSURE: 74 MMHG | HEART RATE: 72 BPM | TEMPERATURE: 99 F | OXYGEN SATURATION: 97 % | RESPIRATION RATE: 16 BRPM | HEIGHT: 70 IN | WEIGHT: 141 LBS | SYSTOLIC BLOOD PRESSURE: 133 MMHG | BODY MASS INDEX: 20.19 KG/M2

## 2025-08-15 DIAGNOSIS — R05.9 COUGH, UNSPECIFIED TYPE: ICD-10-CM

## 2025-08-15 DIAGNOSIS — J40 BRONCHITIS: Primary | ICD-10-CM

## 2025-08-15 DIAGNOSIS — R09.81 SINUS CONGESTION: ICD-10-CM

## 2025-08-15 DIAGNOSIS — R09.89 CHEST CONGESTION: ICD-10-CM

## 2025-08-15 LAB
CTP QC/QA: YES
FLUAV AG NPH QL: NEGATIVE
FLUBV AG NPH QL: NEGATIVE
S PYO RRNA THROAT QL PROBE: NEGATIVE
SARS-COV+SARS-COV-2 AG RESP QL IA.RAPID: NEGATIVE

## 2025-08-15 RX ORDER — ALBUTEROL SULFATE 90 UG/1
2 INHALANT RESPIRATORY (INHALATION) EVERY 6 HOURS PRN
Qty: 18 G | Refills: 0 | Status: SHIPPED | OUTPATIENT
Start: 2025-08-15

## 2025-08-15 RX ORDER — CHLOPHEDIANOL HCL AND PYRILAMINE MALEATE 12.5; 12.5 MG/5ML; MG/5ML
10 SOLUTION ORAL EVERY 8 HOURS PRN
Qty: 118 ML | Refills: 0 | Status: SHIPPED | OUTPATIENT
Start: 2025-08-15 | End: 2025-08-25

## 2025-08-15 RX ORDER — PREDNISONE 20 MG/1
20 TABLET ORAL 2 TIMES DAILY
Qty: 10 TABLET | Refills: 0 | Status: SHIPPED | OUTPATIENT
Start: 2025-08-15 | End: 2025-08-20